# Patient Record
Sex: MALE | HISPANIC OR LATINO | Employment: UNEMPLOYED | URBAN - METROPOLITAN AREA
[De-identification: names, ages, dates, MRNs, and addresses within clinical notes are randomized per-mention and may not be internally consistent; named-entity substitution may affect disease eponyms.]

---
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
## 2024-01-02 LAB — OXCARBAZEPINE SERPL-MCNC: 11 UG/ML (ref 10–35)

## 2025-03-07 ENCOUNTER — RESULTS FOLLOW-UP (OUTPATIENT)
Dept: EMERGENCY DEPT | Facility: HOSPITAL | Age: 20
End: 2025-03-07

## 2025-03-07 ENCOUNTER — HOSPITAL ENCOUNTER (EMERGENCY)
Facility: HOSPITAL | Age: 20
Discharge: HOME | End: 2025-03-07
Attending: EMERGENCY MEDICINE
Payer: COMMERCIAL

## 2025-03-07 VITALS
SYSTOLIC BLOOD PRESSURE: 117 MMHG | HEART RATE: 83 BPM | WEIGHT: 187.83 LBS | RESPIRATION RATE: 17 BRPM | TEMPERATURE: 99 F | OXYGEN SATURATION: 100 % | BODY MASS INDEX: 28.24 KG/M2 | DIASTOLIC BLOOD PRESSURE: 56 MMHG

## 2025-03-07 DIAGNOSIS — J32.9 SINUSITIS: ICD-10-CM

## 2025-03-07 DIAGNOSIS — R51.9 HEADACHE: Primary | ICD-10-CM

## 2025-03-07 LAB
FLUAV RNA RESP QL NAA+PROBE: NEGATIVE
FLUBV RNA RESP QL NAA+PROBE: NEGATIVE
RSV RNA RESP QL NAA+PROBE: NEGATIVE
SARS-COV-2 RNA RESP QL NAA+PROBE: POSITIVE

## 2025-03-07 PROCEDURE — 99283 EMERGENCY DEPT VISIT LOW MDM: CPT

## 2025-03-07 PROCEDURE — 96372 THER/PROPH/DIAG INJ SC/IM: CPT

## 2025-03-07 PROCEDURE — 99284 EMERGENCY DEPT VISIT MOD MDM: CPT | Performed by: EMERGENCY MEDICINE

## 2025-03-07 PROCEDURE — 0241U HB NFCT DS VIR RESP RNA 4 TRGT: CPT | Performed by: EMERGENCY MEDICINE

## 2025-03-07 RX ORDER — KETOROLAC TROMETHAMINE 30 MG/ML
30 INJECTION, SOLUTION INTRAMUSCULAR; INTRAVENOUS ONCE
Status: COMPLETED | OUTPATIENT
Start: 2025-03-07 | End: 2025-03-07

## 2025-03-07 RX ORDER — NAPROXEN 500 MG/1
500 TABLET ORAL 2 TIMES DAILY WITH MEALS
Qty: 30 TABLET | Refills: 0 | Status: SHIPPED | OUTPATIENT
Start: 2025-03-07

## 2025-03-07 RX ADMIN — KETOROLAC TROMETHAMINE 30 MG: 30 INJECTION, SOLUTION INTRAMUSCULAR; INTRAVENOUS at 10:25

## 2025-03-07 RX ADMIN — AMOXICILLIN AND CLAVULANATE POTASSIUM 1 TABLET: 875; 125 TABLET, COATED ORAL at 10:24

## 2025-03-07 NOTE — Clinical Note
Michael Fraser was seen and treated in our emergency department on 3/7/2025.                Diagnosis:     Michael  may return to school on return date.    He may return on this date: 03/09/2025         If you have any questions or concerns, please don't hesitate to call.      Steffany Benjamin, DO    ______________________________           _______________          _______________  Hospital Representative                              Date                                Time

## 2025-03-07 NOTE — ED PROVIDER NOTES
Pt Name: Michael Fraser  MRN: [id number]  Birthdate 2005  Age/Sex: 19 y.o. male  Date of evaluation: 3/7/2025  PCP: Carmen Jo MD        FINAL IMPRESSION    Final diagnoses:   Headache   Sinusitis         DISPOSITION/PLAN      Time reflects when diagnosis was documented in both MDM as applicable and the Disposition within this note       Time User Action Codes Description Comment    3/7/2025 10:17 AM Steffany Benjamin Add [R51.9] Headache     3/7/2025 10:21 AM Steffany Benjamin Add [J32.9] Sinusitis           ED Disposition       ED Disposition   Discharge    Condition   Stable    Date/Time   Fri Mar 7, 2025 10:17 AM    Comment   Michael Fraser discharge to home/self care.                   Follow-up Information       Follow up With Specialties Details Why Contact Info Additional Information    Carmen Jo MD Family Medicine Schedule an appointment as soon as possible for a visit   [facility] [address]  [phone] [place] [address] Emergency Department Emergency Medicine Go to  As needed, If symptoms worsen [address] [place], [place] [id number] [phone] [facility] Emergency Department, [address] [place] [address], [phone]              PATIENT REFERRED TO:    Carmen Jo MD  [address] [address]  [phone]    Schedule an appointment as soon as possible for a visit       [facility] Emergency Department  [address] [place], [place] [id number] [phone]  Go to   As needed, If symptoms worsen      DISCHARGE MEDICATIONS:    Discharge Medication List as of 3/7/2025 10:23 AM        START taking these medications    Details   amoxicillin-clavulanate (AUGMENTIN) 875-125 mg per tablet Take 1 tablet by mouth every 12 (twelve) hours for 7 days, Starting Fri 3/7/2025, Until Fri 3/14/2025, Normal      loratadine-pseudoephedrine (CLARITIN-D 24-HOUR)  mg per  "24 hr tablet Take 1 tablet by mouth daily, Starting Fri 3/7/2025, Normal      naproxen (Naprosyn) 500 mg tablet Take 1 tablet (500 mg total) by mouth 2 (two) times a day with meals, Starting Fri 3/7/2025, Normal           CONTINUE these medications which have NOT CHANGED    Details   albuterol (Ventolin HFA) 90 mcg/act inhaler Inhale 2 puffs every 4 (four) hours as needed for wheezing or shortness of breath, Starting Tue 2/1/2022, Normal      diazePAM, 20 MG Dose, 2 x 10 MG/0.1ML LQPK 1 spray into each nostril, Starting Tue 8/8/2023, Historical Med      docusate sodium (COLACE) 100 mg capsule Take 1 capsule (100 mg total) by mouth 2 (two) times a day, Starting Thu 5/5/2022, Normal      ergocalciferol (VITAMIN D2) 50,000 units Take 1 capsule (50,000 Units total) by mouth 2 (two) times a week, Starting Thu 3/10/2022, Normal      escitalopram (LEXAPRO) 10 mg tablet Take 10 mg by mouth daily, Starting Mon 11/13/2023, Until Tue 3/12/2024, Historical Med      levothyroxine 125 mcg tablet Take 1 tablet (125 mcg total) by mouth daily, Starting Thu 3/10/2022, Normal      loratadine (CLARITIN) 10 mg tablet Take 1 tablet (10 mg total) by mouth daily, Starting Tue 2/1/2022, Until Fri 12/8/2023, Normal      omega-3-acid ethyl esters (LOVAZA) 1 g capsule Take 4 capsules (4 g total) by mouth daily, Starting Thu 3/10/2022, Normal      OXcarbazepine (TRILEPTAL) 300 mg tablet Take 300 mg by mouth 2 (two) times a day, Starting Wed 8/9/2023, Until Thu 8/8/2024, Historical Med             No discharge procedures on file.          CHIEF COMPLAINT    Chief Complaint   Patient presents with    Headache     Pt reports \"sinus headache\"x3 days, reports pain to back of eye and to teeth.         MARYANN Rodriguez presents to the Emergency Department complaining of sinus pressure/ pain for several days.  He feels that he has pain behind his eyes and in his gums.  He denies fever and cough. He does have seasonal allergies as well. "       HPI      Past Medical and Surgical History    Past Medical History:   Diagnosis Date    Asthma     Hypertriglyceridemia     Hypothyroidism (acquired) 09/2013    Vitamin D deficiency        Past Surgical History:   Procedure Laterality Date    NO PAST SURGERIES         Family History   Problem Relation Age of Onset    Diabetes type II Family     Hyperlipidemia Mother     No Known Problems Father     No Known Problems Brother     Heart disease Maternal Grandmother     Hyperlipidemia Maternal Grandmother     Diabetes Maternal Grandmother     Arthritis Maternal Grandmother     Hypertension Maternal Grandmother     Cancer Maternal Grandfather     No Known Problems Paternal Grandmother     No Known Problems Paternal Grandfather        Social History     Tobacco Use    Smoking status: Never    Smokeless tobacco: Never    Tobacco comments:     Hookah    Vaping Use    Vaping status: Former    Substances: Nicotine (Hookah )   Substance Use Topics    Alcohol use: Never    Drug use: Never         .    Allergies    Allergies   Allergen Reactions    Pollen Extract Sneezing    Peanut Oil - Food Allergy Rash and Tongue Swelling       Home Medications    Prior to Admission medications    Medication Sig Start Date End Date Taking? Authorizing Provider   albuterol (Ventolin HFA) 90 mcg/act inhaler Inhale 2 puffs every 4 (four) hours as needed for wheezing or shortness of breath 2/1/22   Onur Diaz,    diazePAM, 20 MG Dose, 2 x 10 MG/0.1ML LQPK 1 spray into each nostril 8/8/23   Historical Provider, MD   docusate sodium (COLACE) 100 mg capsule Take 1 capsule (100 mg total) by mouth 2 (two) times a day  Patient taking differently: Take 100 mg by mouth if needed 5/5/22   Valeria Arenas PA-C   ergocalciferol (VITAMIN D2) 50,000 units Take 1 capsule (50,000 Units total) by mouth 2 (two) times a week 3/10/22   Joana Pnito MD   escitalopram (LEXAPRO) 10 mg tablet Take 10 mg by mouth daily 11/13/23 3/12/24  Historical  Provider, MD   levothyroxine 125 mcg tablet Take 1 tablet (125 mcg total) by mouth daily  Patient taking differently: Take 50 mcg by mouth daily 3/10/22   Joana Pinto MD   loratadine (CLARITIN) 10 mg tablet Take 1 tablet (10 mg total) by mouth daily  Patient taking differently: Take 10 mg by mouth if needed 2/1/22 12/8/23  Onur Diaz DO   qaoxm-7-evic ethyl esters (LOVAZA) 1 g capsule Take 4 capsules (4 g total) by mouth daily 3/10/22   Joana Pinto MD   OXcarbazepine (TRILEPTAL) 300 mg tablet Take 300 mg by mouth 2 (two) times a day 8/9/23 8/8/24  Historical Provider, MD           Review of Systems    Review of Systems   Constitutional:  Negative for chills and fever.   HENT:  Positive for congestion, sinus pressure and sinus pain. Negative for ear pain and sore throat.    Eyes:  Negative for pain and visual disturbance.   Respiratory:  Negative for cough and shortness of breath.    Cardiovascular:  Negative for chest pain and palpitations.   Gastrointestinal:  Negative for abdominal pain and vomiting.   Genitourinary:  Negative for dysuria and hematuria.   Musculoskeletal:  Negative for arthralgias and back pain.   Skin:  Negative for color change and rash.   Neurological:  Negative for seizures and syncope.   All other systems reviewed and are negative.      Physical Exam      ED Triage Vitals   Temperature Pulse Respirations Blood Pressure SpO2   03/07/25 0947 03/07/25 0948 03/07/25 0948 03/07/25 0948 03/07/25 0948   99 °F (37.2 °C) 83 17 117/56 100 %      Temp src Heart Rate Source Patient Position - Orthostatic VS BP Location FiO2 (%)   -- 03/07/25 0948 03/07/25 0948 03/07/25 0948 --    Monitor Sitting Right arm       Pain Score       03/07/25 1024       8               Physical Exam  Vitals and nursing note reviewed.   Constitutional:       General: He is not in acute distress.     Appearance: He is well-developed. He is not diaphoretic.   HENT:      Head: Normocephalic and atraumatic.      Nose:  Congestion present.      Right Sinus: Maxillary sinus tenderness and frontal sinus tenderness present.      Left Sinus: Maxillary sinus tenderness and frontal sinus tenderness present.   Eyes:      Conjunctiva/sclera: Conjunctivae normal.      Pupils: Pupils are equal, round, and reactive to light.   Cardiovascular:      Rate and Rhythm: Normal rate and regular rhythm.      Heart sounds: Normal heart sounds. No murmur heard.     No friction rub. No gallop.   Pulmonary:      Effort: Pulmonary effort is normal. No respiratory distress.      Breath sounds: Normal breath sounds. No wheezing or rales.   Abdominal:      General: Bowel sounds are normal.      Palpations: Abdomen is soft.      Tenderness: There is no abdominal tenderness. There is no guarding or rebound.   Musculoskeletal:         General: Normal range of motion.      Cervical back: Normal range of motion and neck supple.   Skin:     General: Skin is warm and dry.   Neurological:      Mental Status: He is alert and oriented to person, place, and time.   Psychiatric:         Behavior: Behavior normal.                Assessment and Plan    Michael Fraser is a 19 y.o. male who presents with sinus pain and pressure. Physical examination remarkable for sinus tenderness. Differential diagnosis (not completely inclusive) includes viral syndrome vs bacterial infection as well. Plan will be to perform diagnostic testing/ viral testing and treat symptomatically.      MDM      Diagnostic Results      Labs:    Results for orders placed or performed during the hospital encounter of 03/07/25   FLU/RSV/COVID - if FLU/RSV clinically relevant (2hr TAT)    Specimen: Nose; Nares   Result Value Ref Range    SARS-CoV-2 Positive (A) Negative    INFLUENZA A PCR Negative Negative    INFLUENZA B PCR Negative Negative    RSV PCR Negative Negative       All labs reviewed and utilized in the medical decision making process    Radiology:    No orders to display       All radiology  studies independently viewed by me and interpreted by the radiologist.    Procedure    Procedures      ED Course of Care and Re-Assessments        Medications   ketorolac (TORADOL) injection 30 mg (30 mg Intramuscular Given 3/7/25 1025)   amoxicillin-clavulanate (AUGMENTIN) 875-125 mg per tablet 1 tablet (1 tablet Oral Given 3/7/25 1024)                  DO Steffany Quiroga DO  03/08/25 5670